# Patient Record
Sex: MALE | Race: OTHER | NOT HISPANIC OR LATINO | URBAN - METROPOLITAN AREA
[De-identification: names, ages, dates, MRNs, and addresses within clinical notes are randomized per-mention and may not be internally consistent; named-entity substitution may affect disease eponyms.]

---

## 2023-07-07 VITALS
OXYGEN SATURATION: 98 % | HEART RATE: 66 BPM | TEMPERATURE: 98 F | WEIGHT: 156.97 LBS | DIASTOLIC BLOOD PRESSURE: 76 MMHG | RESPIRATION RATE: 17 BRPM | SYSTOLIC BLOOD PRESSURE: 161 MMHG | HEIGHT: 67 IN

## 2023-07-07 LAB
HCT VFR BLD CALC: 32.8 % — LOW (ref 39–50)
HGB BLD-MCNC: 10.6 G/DL — LOW (ref 13–17)
MCHC RBC-ENTMCNC: 30.8 PG — SIGNIFICANT CHANGE UP (ref 27–34)
MCHC RBC-ENTMCNC: 32.3 GM/DL — SIGNIFICANT CHANGE UP (ref 32–36)
MCV RBC AUTO: 95.3 FL — SIGNIFICANT CHANGE UP (ref 80–100)
PLATELET # BLD AUTO: 193 K/UL — SIGNIFICANT CHANGE UP (ref 150–400)
RBC # BLD: 3.44 M/UL — LOW (ref 4.2–5.8)
RBC # FLD: 12.8 % — SIGNIFICANT CHANGE UP (ref 10.3–14.5)
WBC # BLD: 2.56 K/UL — LOW (ref 3.8–10.5)
WBC # FLD AUTO: 2.56 K/UL — LOW (ref 3.8–10.5)

## 2023-07-07 PROCEDURE — 99285 EMERGENCY DEPT VISIT HI MDM: CPT

## 2023-07-07 PROCEDURE — 99053 MED SERV 10PM-8AM 24 HR FAC: CPT

## 2023-07-07 PROCEDURE — 71045 X-RAY EXAM CHEST 1 VIEW: CPT | Mod: 26

## 2023-07-07 NOTE — ED ADULT TRIAGE NOTE - CHIEF COMPLAINT QUOTE
Pt presents with c/o bilateral "leg swelling" x approx one week. Denies any pain, SOB, or chest discomfort. Dx with lymphedema by clinic.

## 2023-07-07 NOTE — ED ADULT NURSE NOTE - NSFALLUNIVINTERV_ED_ALL_ED
Bed/Stretcher in lowest position, wheels locked, appropriate side rails in place/Call bell, personal items and telephone in reach/Instruct patient to call for assistance before getting out of bed/chair/stretcher/Non-slip footwear applied when patient is off stretcher/[place] to call system/Physically safe environment - no spills, clutter or unnecessary equipment/Purposeful proactive rounding/Room/bathroom lighting operational, light cord in reach

## 2023-07-08 ENCOUNTER — INPATIENT (INPATIENT)
Facility: HOSPITAL | Age: 83
LOS: 1 days | Discharge: HOME | DRG: 603 | End: 2023-07-10
Attending: STUDENT IN AN ORGANIZED HEALTH CARE EDUCATION/TRAINING PROGRAM | Admitting: STUDENT IN AN ORGANIZED HEALTH CARE EDUCATION/TRAINING PROGRAM
Payer: MEDICARE

## 2023-07-08 DIAGNOSIS — I16.0 HYPERTENSIVE URGENCY: ICD-10-CM

## 2023-07-08 DIAGNOSIS — Z29.9 ENCOUNTER FOR PROPHYLACTIC MEASURES, UNSPECIFIED: ICD-10-CM

## 2023-07-08 DIAGNOSIS — I10 ESSENTIAL (PRIMARY) HYPERTENSION: ICD-10-CM

## 2023-07-08 DIAGNOSIS — M79.89 OTHER SPECIFIED SOFT TISSUE DISORDERS: ICD-10-CM

## 2023-07-08 DIAGNOSIS — L03.90 CELLULITIS, UNSPECIFIED: ICD-10-CM

## 2023-07-08 DIAGNOSIS — D64.9 ANEMIA, UNSPECIFIED: ICD-10-CM

## 2023-07-08 DIAGNOSIS — L03.119 CELLULITIS OF UNSPECIFIED PART OF LIMB: ICD-10-CM

## 2023-07-08 LAB
ALBUMIN SERPL ELPH-MCNC: 3.9 G/DL — SIGNIFICANT CHANGE UP (ref 3.3–5)
ALP SERPL-CCNC: 108 U/L — SIGNIFICANT CHANGE UP (ref 40–120)
ALT FLD-CCNC: 16 U/L — SIGNIFICANT CHANGE UP (ref 10–45)
ANION GAP SERPL CALC-SCNC: 11 MMOL/L — SIGNIFICANT CHANGE UP (ref 5–17)
ANION GAP SERPL CALC-SCNC: 12 MMOL/L — SIGNIFICANT CHANGE UP (ref 5–17)
ANISOCYTOSIS BLD QL: SLIGHT — SIGNIFICANT CHANGE UP
AST SERPL-CCNC: 21 U/L — SIGNIFICANT CHANGE UP (ref 10–40)
BASOPHILS # BLD AUTO: 0.01 K/UL — SIGNIFICANT CHANGE UP (ref 0–0.2)
BASOPHILS # BLD AUTO: 0.02 K/UL — SIGNIFICANT CHANGE UP (ref 0–0.2)
BASOPHILS NFR BLD AUTO: 0.4 % — SIGNIFICANT CHANGE UP (ref 0–2)
BASOPHILS NFR BLD AUTO: 0.9 % — SIGNIFICANT CHANGE UP (ref 0–2)
BILIRUB SERPL-MCNC: 0.8 MG/DL — SIGNIFICANT CHANGE UP (ref 0.2–1.2)
BUN SERPL-MCNC: 10 MG/DL — SIGNIFICANT CHANGE UP (ref 7–23)
BUN SERPL-MCNC: 9 MG/DL — SIGNIFICANT CHANGE UP (ref 7–23)
BURR CELLS BLD QL SMEAR: PRESENT — SIGNIFICANT CHANGE UP
CALCIUM SERPL-MCNC: 9 MG/DL — SIGNIFICANT CHANGE UP (ref 8.4–10.5)
CALCIUM SERPL-MCNC: 9.2 MG/DL — SIGNIFICANT CHANGE UP (ref 8.4–10.5)
CHLORIDE SERPL-SCNC: 100 MMOL/L — SIGNIFICANT CHANGE UP (ref 96–108)
CHLORIDE SERPL-SCNC: 104 MMOL/L — SIGNIFICANT CHANGE UP (ref 96–108)
CO2 SERPL-SCNC: 24 MMOL/L — SIGNIFICANT CHANGE UP (ref 22–31)
CO2 SERPL-SCNC: 28 MMOL/L — SIGNIFICANT CHANGE UP (ref 22–31)
CREAT SERPL-MCNC: 1.03 MG/DL — SIGNIFICANT CHANGE UP (ref 0.5–1.3)
CREAT SERPL-MCNC: 1.04 MG/DL — SIGNIFICANT CHANGE UP (ref 0.5–1.3)
EGFR: 71 ML/MIN/1.73M2 — SIGNIFICANT CHANGE UP
EGFR: 72 ML/MIN/1.73M2 — SIGNIFICANT CHANGE UP
EOSINOPHIL # BLD AUTO: 0.02 K/UL — SIGNIFICANT CHANGE UP (ref 0–0.5)
EOSINOPHIL # BLD AUTO: 0.02 K/UL — SIGNIFICANT CHANGE UP (ref 0–0.5)
EOSINOPHIL NFR BLD AUTO: 0.8 % — SIGNIFICANT CHANGE UP (ref 0–6)
EOSINOPHIL NFR BLD AUTO: 0.8 % — SIGNIFICANT CHANGE UP (ref 0–6)
GIANT PLATELETS BLD QL SMEAR: PRESENT — SIGNIFICANT CHANGE UP
GLUCOSE SERPL-MCNC: 118 MG/DL — HIGH (ref 70–99)
GLUCOSE SERPL-MCNC: 94 MG/DL — SIGNIFICANT CHANGE UP (ref 70–99)
HCT VFR BLD CALC: 33.6 % — LOW (ref 39–50)
HGB BLD-MCNC: 11.2 G/DL — LOW (ref 13–17)
IMM GRANULOCYTES NFR BLD AUTO: 0.4 % — SIGNIFICANT CHANGE UP (ref 0–0.9)
LYMPHOCYTES # BLD AUTO: 0.77 K/UL — LOW (ref 1–3.3)
LYMPHOCYTES # BLD AUTO: 0.82 K/UL — LOW (ref 1–3.3)
LYMPHOCYTES # BLD AUTO: 28.9 % — SIGNIFICANT CHANGE UP (ref 13–44)
LYMPHOCYTES # BLD AUTO: 32.2 % — SIGNIFICANT CHANGE UP (ref 13–44)
MACROCYTES BLD QL: SLIGHT — SIGNIFICANT CHANGE UP
MAGNESIUM SERPL-MCNC: 1.7 MG/DL — SIGNIFICANT CHANGE UP (ref 1.6–2.6)
MANUAL SMEAR VERIFICATION: SIGNIFICANT CHANGE UP
MCHC RBC-ENTMCNC: 31.6 PG — SIGNIFICANT CHANGE UP (ref 27–34)
MCHC RBC-ENTMCNC: 33.3 GM/DL — SIGNIFICANT CHANGE UP (ref 32–36)
MCV RBC AUTO: 94.9 FL — SIGNIFICANT CHANGE UP (ref 80–100)
MICROCYTES BLD QL: SLIGHT — SIGNIFICANT CHANGE UP
MONOCYTES # BLD AUTO: 0.51 K/UL — SIGNIFICANT CHANGE UP (ref 0–0.9)
MONOCYTES # BLD AUTO: 0.64 K/UL — SIGNIFICANT CHANGE UP (ref 0–0.9)
MONOCYTES NFR BLD AUTO: 20 % — HIGH (ref 2–14)
MONOCYTES NFR BLD AUTO: 24.1 % — HIGH (ref 2–14)
MYELOCYTES NFR BLD: 0.9 % — HIGH (ref 0–0)
NEUTROPHILS # BLD AUTO: 1.16 K/UL — LOW (ref 1.8–7.4)
NEUTROPHILS # BLD AUTO: 1.21 K/UL — LOW (ref 1.8–7.4)
NEUTROPHILS NFR BLD AUTO: 45.2 % — SIGNIFICANT CHANGE UP (ref 43–77)
NEUTROPHILS NFR BLD AUTO: 45.4 % — SIGNIFICANT CHANGE UP (ref 43–77)
NRBC # BLD: 0 /100 WBCS — SIGNIFICANT CHANGE UP (ref 0–0)
NT-PROBNP SERPL-SCNC: 447 PG/ML — HIGH (ref 0–300)
OVALOCYTES BLD QL SMEAR: SLIGHT — SIGNIFICANT CHANGE UP
PHOSPHATE SERPL-MCNC: 3.5 MG/DL — SIGNIFICANT CHANGE UP (ref 2.5–4.5)
PLAT MORPH BLD: ABNORMAL
PLATELET # BLD AUTO: 197 K/UL — SIGNIFICANT CHANGE UP (ref 150–400)
POIKILOCYTOSIS BLD QL AUTO: SLIGHT — SIGNIFICANT CHANGE UP
POLYCHROMASIA BLD QL SMEAR: SLIGHT — SIGNIFICANT CHANGE UP
POTASSIUM SERPL-MCNC: 3 MMOL/L — LOW (ref 3.5–5.3)
POTASSIUM SERPL-MCNC: 3.5 MMOL/L — SIGNIFICANT CHANGE UP (ref 3.5–5.3)
POTASSIUM SERPL-SCNC: 3 MMOL/L — LOW (ref 3.5–5.3)
POTASSIUM SERPL-SCNC: 3.5 MMOL/L — SIGNIFICANT CHANGE UP (ref 3.5–5.3)
PROT SERPL-MCNC: 7 G/DL — SIGNIFICANT CHANGE UP (ref 6–8.3)
RBC # BLD: 3.54 M/UL — LOW (ref 4.2–5.8)
RBC # FLD: 12.5 % — SIGNIFICANT CHANGE UP (ref 10.3–14.5)
RBC BLD AUTO: ABNORMAL
SODIUM SERPL-SCNC: 139 MMOL/L — SIGNIFICANT CHANGE UP (ref 135–145)
SODIUM SERPL-SCNC: 140 MMOL/L — SIGNIFICANT CHANGE UP (ref 135–145)
WBC # BLD: 2.66 K/UL — LOW (ref 3.8–10.5)
WBC # FLD AUTO: 2.66 K/UL — LOW (ref 3.8–10.5)

## 2023-07-08 PROCEDURE — 93971 EXTREMITY STUDY: CPT | Mod: 26,RT

## 2023-07-08 PROCEDURE — 99223 1ST HOSP IP/OBS HIGH 75: CPT | Mod: GC

## 2023-07-08 PROCEDURE — 71045 X-RAY EXAM CHEST 1 VIEW: CPT | Mod: 26

## 2023-07-08 RX ORDER — FUROSEMIDE 40 MG
20 TABLET ORAL ONCE
Refills: 0 | Status: COMPLETED | OUTPATIENT
Start: 2023-07-08 | End: 2023-07-08

## 2023-07-08 RX ORDER — HYDRALAZINE HCL 50 MG
10 TABLET ORAL ONCE
Refills: 0 | Status: COMPLETED | OUTPATIENT
Start: 2023-07-08 | End: 2023-07-08

## 2023-07-08 RX ORDER — LANOLIN ALCOHOL/MO/W.PET/CERES
3 CREAM (GRAM) TOPICAL AT BEDTIME
Refills: 0 | Status: DISCONTINUED | OUTPATIENT
Start: 2023-07-08 | End: 2023-07-10

## 2023-07-08 RX ORDER — ACETAMINOPHEN 500 MG
650 TABLET ORAL EVERY 6 HOURS
Refills: 0 | Status: DISCONTINUED | OUTPATIENT
Start: 2023-07-08 | End: 2023-07-10

## 2023-07-08 RX ORDER — CEFAZOLIN SODIUM 1 G
1000 VIAL (EA) INJECTION EVERY 8 HOURS
Refills: 0 | Status: DISCONTINUED | OUTPATIENT
Start: 2023-07-08 | End: 2023-07-10

## 2023-07-08 RX ORDER — MAGNESIUM SULFATE 500 MG/ML
2 VIAL (ML) INJECTION ONCE
Refills: 0 | Status: COMPLETED | OUTPATIENT
Start: 2023-07-08 | End: 2023-07-08

## 2023-07-08 RX ORDER — POTASSIUM CHLORIDE 20 MEQ
40 PACKET (EA) ORAL EVERY 4 HOURS
Refills: 0 | Status: COMPLETED | OUTPATIENT
Start: 2023-07-08 | End: 2023-07-08

## 2023-07-08 RX ORDER — LABETALOL HCL 100 MG
20 TABLET ORAL ONCE
Refills: 0 | Status: COMPLETED | OUTPATIENT
Start: 2023-07-08 | End: 2023-07-08

## 2023-07-08 RX ORDER — VANCOMYCIN HCL 1 G
1000 VIAL (EA) INTRAVENOUS ONCE
Refills: 0 | Status: COMPLETED | OUTPATIENT
Start: 2023-07-08 | End: 2023-07-08

## 2023-07-08 RX ORDER — ENOXAPARIN SODIUM 100 MG/ML
40 INJECTION SUBCUTANEOUS EVERY 24 HOURS
Refills: 0 | Status: DISCONTINUED | OUTPATIENT
Start: 2023-07-08 | End: 2023-07-10

## 2023-07-08 RX ADMIN — Medication 100 MILLIGRAM(S): at 13:13

## 2023-07-08 RX ADMIN — Medication 40 MILLIEQUIVALENT(S): at 13:14

## 2023-07-08 RX ADMIN — Medication 100 MILLIGRAM(S): at 06:21

## 2023-07-08 RX ADMIN — Medication 100 MILLIGRAM(S): at 22:41

## 2023-07-08 RX ADMIN — Medication 20 MILLIGRAM(S): at 01:45

## 2023-07-08 RX ADMIN — Medication 20 MILLIGRAM(S): at 06:21

## 2023-07-08 RX ADMIN — ENOXAPARIN SODIUM 40 MILLIGRAM(S): 100 INJECTION SUBCUTANEOUS at 06:21

## 2023-07-08 RX ADMIN — Medication 20 MILLIGRAM(S): at 02:32

## 2023-07-08 RX ADMIN — Medication 250 MILLIGRAM(S): at 02:45

## 2023-07-08 RX ADMIN — Medication 40 MILLIEQUIVALENT(S): at 10:46

## 2023-07-08 RX ADMIN — Medication 10 MILLIGRAM(S): at 09:31

## 2023-07-08 RX ADMIN — Medication 25 GRAM(S): at 10:46

## 2023-07-08 NOTE — PATIENT PROFILE ADULT - FUNCTIONAL ASSESSMENT - BASIC MOBILITY 6.
3-calculated by average/Not able to assess (calculate score using [facility] averaging method)

## 2023-07-08 NOTE — H&P ADULT - ASSESSMENT
82 yo M w PMH of HTN, p/w 1 week of BLE swelling, R>L, admitted for diuresis, evaluation and management.

## 2023-07-08 NOTE — PROGRESS NOTE ADULT - PROBLEM SELECTOR PLAN 5
- Patient endorses taking metoprolol, unsure of dosing, goes to [facility]  - Nifedipine 30mg prn?  - Metoprolol 25mg BID,

## 2023-07-08 NOTE — PATIENT PROFILE ADULT - FALL HARM RISK - HARM RISK INTERVENTIONS

## 2023-07-08 NOTE — PROGRESS NOTE ADULT - PROBLEM SELECTOR PLAN 4
Pt hyptertensive to 200 systolically in ED. Given with   - Patient endorses taking metoprolol, unsure of dosing, goes to [facility]  - Lasix 40 IV BID  - Nifedipine 30mg prn for hypertensive urgency.   - Restart med recs. F: None   E: Replete as necessary K>4 Mg>2  N: Dash Diet   DVT Prophylaxis: Lovenox 40mg daily   GI prophylaxis: None   CODE STATUS: FULL  DISPO: [facility]

## 2023-07-08 NOTE — PATIENT PROFILE ADULT - NSPRESCRALCSIXMORE_GEN_A_NUR
I reviewed the H&P, I examined the patient, and there are no changes in the patient's condition.  
Never

## 2023-07-08 NOTE — H&P ADULT - NSHPLABSRESULTS_GEN_ALL_CORE
LABS:                         10.6   2.56  )-----------( 193      ( 07 Jul 2023 23:08 )             32.8     07-07    139  |  104  |  10  ----------------------------<  94  3.5   |  24  |  1.03    Ca    9.2      07 Jul 2023 23:08        Urinalysis Basic - ( 07 Jul 2023 23:08 )    Color: x / Appearance: x / SG: x / pH: x  Gluc: 94 mg/dL / Ketone: x  / Bili: x / Urobili: x   Blood: x / Protein: x / Nitrite: x   Leuk Esterase: x / RBC: x / WBC x   Sq Epi: x / Non Sq Epi: x / Bacteria: x                RADIOLOGY, EKG & ADDITIONAL TESTS:

## 2023-07-08 NOTE — ED PROVIDER NOTE - CLINICAL SUMMARY MEDICAL DECISION MAKING FREE TEXT BOX
BLE swelling, R>L w erythematous rash on RLE.  Concern for RLE DVT vs Cellulitis  R/o DVT w US  Labs  Abx for possible cellulitis  No evidence of arterial insufficiency  Pt non-septic, vs wnl  No evidence of overt CHF, pt breathing comfortably on RA, no orthopnea, clear lungs on CXR  Will give lasix, admit for IV abx and diuresis.

## 2023-07-08 NOTE — H&P ADULT - PROBLEM SELECTOR PLAN 4
F: None   E: Replete as necessary K>4 Mg>2  N: Dash Diet   DVT Prophylaxis: Lovenox 40mg daily   GI prophylaxis: None   CODE STATUS: FULL  DISPO: [facility] Iron studies in AM  No bleeding.  Active T&S Med Rec in AM  - Patient endorses taking metoprolol, unsure of dosing, goes to [facility]  - Nifedipine 30mg prn?  - Metoprolol 25mg BID,

## 2023-07-08 NOTE — H&P ADULT - ATTENDING COMMENTS
RLE non Purulent Cellulitis   Lower extremity Swelling   HTN urgency     - IV antibiotics   - Check UA   - Continue metoprolol , nifedipine   - echo RLE non Purulent Cellulitis   Lower extremity Swelling   HTN urgency   Leukopenia and anemia     - IV antibiotics   - Check UA  to evaluate for protein , if + will need further work up   - Continue metoprolol , nifedipine   - echo

## 2023-07-08 NOTE — PROGRESS NOTE ADULT - PROBLEM SELECTOR PLAN 1
Pt presenting with erythematous, macular rash with one bullae up to shins. Nonblanching, nonfluctuant, tender to palpation, nondraining. s/p 1x vancomycin in ED. Afebrile, nontachycardic, but leukoc  Plan:  - Cefazolin 1g [id number]  - Wound care consult in AM  - Tyelnol PRN for fever. Pt presenting with erythematous, macular rash with one bullae up to shins. Nonblanching, nonfluctuant, tender to palpation, nondraining. s/p 1x vancomycin in ED. Afebrile, nontachycardic, but leukoc  Plan:  - Cefazolin 1g [id number]  - Wound care consult  - Tyelnol PRN for fever.  - Daily weights, strict i/os  - Echocardiogram pending

## 2023-07-08 NOTE — PROGRESS NOTE ADULT - PROBLEM SELECTOR PLAN 2
Likely 2/2 non-purulent cellulitis   Pt presenting with 1 week of insidious LE swelling R>L.   Ddx including new onset HF exacerbation, changes 2/2 cellulitis/in. US showing no dvt. EKG NSR  Pt placed on Lasix, very dry on exam and low suspension for new onset HF, will d/c at this time   Plan:  - Treat cellulitsi as below  - Daily weights, strict i/os  - Echocardiogram pending Pt hyptertensive to 200 systolically in ED. Given with   - Patient endorses taking metoprolol, unsure dosing   Plan:  - Nifedipine 30mg prn for hypertensive urgency.   - Metoprolol 25mg BID,

## 2023-07-08 NOTE — H&P ADULT - PROBLEM SELECTOR PLAN 3
Med Rec in AM  - Patient endorses taking metoprolol, unsure of dosing, goes to [facility]  - Nifedipine 30mg prn?  - Metoprolol 25mg BID, Med Rec in AM. Pt hyptertensive to 200 systolically in ED.   - Patient endorses taking metoprolol, unsure of dosing, goes to [facility]  - Lasix 40 IV BID  - Nifedipine 30mg prn for hypertensive urgency.   - Restart med recs.

## 2023-07-08 NOTE — H&P ADULT - PROBLEM SELECTOR PLAN 6
F: None   E: Replete as necessary K>4 Mg>2  N: Dash Diet   DVT Prophylaxis: Lovenox 40mg daily   GI prophylaxis: None   CODE STATUS: FULL  DISPO: [facility]

## 2023-07-08 NOTE — ED PROVIDER NOTE - PHYSICAL EXAMINATION
CONSTITUTIONAL: Non-toxic; in no apparent distress  HEAD: Normocephalic; atraumatic  EYES: PERRL; EOM intact   ENMT: External appears normal  NECK: Supple; non-tender  CARD: Normal S1, S2; no murmurs, rubs, or gallops  RESP: Normal chest excursion with respiration; breath sounds clear and equal bilaterally  ABD: Soft, non-distended; non-tender  EXT: Normal ROM in all four extremities; + BLE peripheral edema to shins R>L, + erythematous circumferential rash on RLE shin/calf, mild ttp, +calor, WWP, cap refill <2s, DP and PT pulses 2+ BL   SKIN: Warm, dry, no rash  NEURO:  No focal neurological deficiencies.

## 2023-07-08 NOTE — PROGRESS NOTE ADULT - PROBLEM SELECTOR PLAN 7
F: None   E: Replete as necessary K>4 Mg>2  N: Dash Diet   DVT Prophylaxis: Lovenox 40mg daily   GI prophylaxis: None   CODE STATUS: FULL  DISPO: [facility]

## 2023-07-08 NOTE — H&P ADULT - PROBLEM SELECTOR PLAN 5
F: None   E: Replete as necessary K>4 Mg>2  N: Dash Diet   DVT Prophylaxis: Lovenox 40mg daily   GI prophylaxis: None   CODE STATUS: FULL  DISPO: [facility] Iron studies in AM  No bleeding.  Active T&S

## 2023-07-08 NOTE — ED PROVIDER NOTE - OBJECTIVE STATEMENT
84 yo M w PMH of HTN, p/w 1 week of BLE swelling, R>L. Patient has erythematous rash over RLE that is mildly TTP. No fever or chills. He has no history of venous insufficiency in the past, no history of CHF, states he has received echo in past year that was normal. No change in urination. He was sent in by his PMD for evaluation, concern for DVT, infection. No paresthesia, weakness, numbness, cough, hemoptysis, exogenous estrogen, recent travel, surgery, malignancy, personal or FHx of VTE.

## 2023-07-08 NOTE — H&P ADULT - PROBLEM SELECTOR PLAN 2
Pt presenting with erythematous, macular rash with one bullae up to shins. Nonblanching, nonfluctuant, tender to palpation, nondraining. s/p 1x vancomycin in ED. Afebrile, nontachycardic, but leukoc  - Cefazolin 1g [id number]  - Wound care consult in AM Pt presenting with erythematous, macular rash with one bullae up to shins. Nonblanching, nonfluctuant, tender to palpation, nondraining. s/p 1x vancomycin in ED. Afebrile, nontachycardic, but leukoc  - Cefazolin 1g [id number]  - Wound care consult in AM  - Tyelnol PRN for fever.

## 2023-07-08 NOTE — H&P ADULT - PROBLEM SELECTOR PLAN 1
Pt presenting with 1 week of insidious LE swelling R>L. Ddx including new onset HF exacerbation, changes 2/2 cellulitis. BNP slightly elevated. Pt presenting with 1 week of insidious LE swelling R>L. Ddx including new onset HF exacerbation, changes 2/2 cellulitis/in. US showing no dvt. EKG NSR  - Treat cellulitsi as below  - lasix 20mg IV qd in AM  - Daily weights, strict i/os  - Echocardiogram

## 2023-07-08 NOTE — H&P ADULT - HISTORY OF PRESENT ILLNESS
84 yo M w PMH of HTN, p/w insidious onset of 1 week of BLE swelling, R>L. The swelling started one week ago with a rash appearing at the same time- no prior fever, dyspnea, chest pain, fatigue, diarrhea, dysuria. He denies h/o CHF (endorses a benign echo in the last year). Due to the swelling he had a walk in appointment with his PCP who recommended that he come into the hospital.   Patient has had no trauma to the leg, does not remember if the rash spread or appeared all at once. He has had no sick contacts, medication changes, or recent surgery.     ED Course  T98.4 HR 71 /76->200/80->166/70 RR18 Sat 97%  WBC 2.56 Hb 10.6    US RLE: No DVT  CXR: Possible RLE consolidation  Interventions: Lasix 20 x 1, labetalol 20 x 1, vanc1g x 1.  EKG: NSR

## 2023-07-08 NOTE — H&P ADULT - NSHPPHYSICALEXAM_GEN_ALL_CORE
PHYSICAL EXAM:  Constitutional: NAD, comfortable in bed.  HEENT: NC/AT, PERRLA, EOMI, no conjunctival pallor or scleral icterus, MMM  Neck: Supple, no JVD  Respiratory: CTA B/L. No w/r/r.   Cardiovascular: RRR, normal S1 and S2, no m/r/g.   Gastrointestinal: +BS, soft NTND, no guarding or rebound tenderness, no palpable masses   Extremities: wwp; no cyanosis, clubbing. 2+ pitting edema to shins r>l.   Vascular: Pulses equal and strong throughout.   Neurological: AAOx3, no CN deficits, strength and sensation intact throughout.   Skin: Erythematous nonblanching maculopapular rash overlaid on venous stasis changes.

## 2023-07-09 LAB
ANION GAP SERPL CALC-SCNC: 9 MMOL/L — SIGNIFICANT CHANGE UP (ref 5–17)
BUN SERPL-MCNC: 11 MG/DL — SIGNIFICANT CHANGE UP (ref 7–23)
CALCIUM SERPL-MCNC: 8.7 MG/DL — SIGNIFICANT CHANGE UP (ref 8.4–10.5)
CHLORIDE SERPL-SCNC: 100 MMOL/L — SIGNIFICANT CHANGE UP (ref 96–108)
CO2 SERPL-SCNC: 29 MMOL/L — SIGNIFICANT CHANGE UP (ref 22–31)
CREAT SERPL-MCNC: 1.15 MG/DL — SIGNIFICANT CHANGE UP (ref 0.5–1.3)
EGFR: 63 ML/MIN/1.73M2 — SIGNIFICANT CHANGE UP
GLUCOSE SERPL-MCNC: 108 MG/DL — HIGH (ref 70–99)
HCT VFR BLD CALC: 33.8 % — LOW (ref 39–50)
HGB BLD-MCNC: 10.9 G/DL — LOW (ref 13–17)
MAGNESIUM SERPL-MCNC: 1.6 MG/DL — SIGNIFICANT CHANGE UP (ref 1.6–2.6)
MCHC RBC-ENTMCNC: 30.9 PG — SIGNIFICANT CHANGE UP (ref 27–34)
MCHC RBC-ENTMCNC: 32.2 GM/DL — SIGNIFICANT CHANGE UP (ref 32–36)
MCV RBC AUTO: 95.8 FL — SIGNIFICANT CHANGE UP (ref 80–100)
NRBC # BLD: 0 /100 WBCS — SIGNIFICANT CHANGE UP (ref 0–0)
PHOSPHATE SERPL-MCNC: 3.4 MG/DL — SIGNIFICANT CHANGE UP (ref 2.5–4.5)
PLATELET # BLD AUTO: 205 K/UL — SIGNIFICANT CHANGE UP (ref 150–400)
POTASSIUM SERPL-MCNC: 3.2 MMOL/L — LOW (ref 3.5–5.3)
POTASSIUM SERPL-SCNC: 3.2 MMOL/L — LOW (ref 3.5–5.3)
RBC # BLD: 3.53 M/UL — LOW (ref 4.2–5.8)
RBC # FLD: 13 % — SIGNIFICANT CHANGE UP (ref 10.3–14.5)
SODIUM SERPL-SCNC: 138 MMOL/L — SIGNIFICANT CHANGE UP (ref 135–145)
WBC # BLD: 2.47 K/UL — LOW (ref 3.8–10.5)
WBC # FLD AUTO: 2.47 K/UL — LOW (ref 3.8–10.5)

## 2023-07-09 PROCEDURE — 99233 SBSQ HOSP IP/OBS HIGH 50: CPT | Mod: GC

## 2023-07-09 RX ORDER — NIFEDIPINE 30 MG
30 TABLET, EXTENDED RELEASE 24 HR ORAL EVERY 24 HOURS
Refills: 0 | Status: DISCONTINUED | OUTPATIENT
Start: 2023-07-09 | End: 2023-07-10

## 2023-07-09 RX ADMIN — ENOXAPARIN SODIUM 40 MILLIGRAM(S): 100 INJECTION SUBCUTANEOUS at 06:46

## 2023-07-09 RX ADMIN — Medication 100 MILLIGRAM(S): at 13:16

## 2023-07-09 RX ADMIN — Medication 30 MILLIGRAM(S): at 08:59

## 2023-07-09 RX ADMIN — Medication 100 MILLIGRAM(S): at 23:15

## 2023-07-09 RX ADMIN — Medication 100 MILLIGRAM(S): at 06:46

## 2023-07-09 NOTE — PROGRESS NOTE ADULT - SUBJECTIVE AND OBJECTIVE BOX
Patient is a 83y old  Male who presents with a chief complaint of Right Lower extremity swelling and rash (08 Jul 2023 13:54)        SUBJECTIVE:  Patient was seen and examined at bedside.    Overnight Events : reports right lower extremity pain and rash improving , denies fever , chills        Review of systems: 12 point Review of systems negative unless otherwise documented elsewhere in note.     Diet, DASH/TLC:   Sodium & Cholesterol Restricted (07-08-23 @ 04:26) [Active]      MEDICATIONS:  MEDICATIONS  (STANDING):  ceFAZolin   IVPB 1000 milliGRAM(s) IV Intermittent every 8 hours  enoxaparin Injectable 40 milliGRAM(s) SubCutaneous every 24 hours  NIFEdipine XL 30 milliGRAM(s) Oral every 24 hours    MEDICATIONS  (PRN):  acetaminophen     Tablet .. 650 milliGRAM(s) Oral every 6 hours PRN Temp greater or equal to 38C (100.4F), Mild Pain (1 - 3)  melatonin 3 milliGRAM(s) Oral at bedtime PRN Insomnia      Allergies    No Known Allergies    Intolerances        OBJECTIVE:  Vital Signs Last 24 Hrs  T(C): 36.6 (09 Jul 2023 12:35), Max: 37.6 (08 Jul 2023 21:00)  T(F): 97.8 (09 Jul 2023 12:35), Max: 99.6 (08 Jul 2023 21:00)  HR: 91 (09 Jul 2023 12:35) (60 - 91)  BP: 143/69 (09 Jul 2023 12:35) (121/65 - 180/80)  BP(mean): --  RR: 18 (09 Jul 2023 12:35) (18 - 18)  SpO2: 96% (09 Jul 2023 12:35) (96% - 99%)    Parameters below as of 09 Jul 2023 12:35  Patient On (Oxygen Delivery Method): room air      I&O's Summary      PHYSICAL EXAM:  Gen: Resting in bed at time of exam, not in distress   HEENT: moist mucosa, no lesions   Neck: supple, trachea at midline  CV: RRR, +S1/S2  Pulm: no wheezing , no crackles  no increase in work of breathing  Abd: soft, NTND  Skin: circumferential erythema in the lower 1/3rd of the right leg with warmth and tenderness , patchy no circumferntial rash in the mid thigh , no purulence , 1cm bled in the right lower leg medial aspect draining clear fluid , no evidence of purulence   Ext:  Bilateral lower Extremity Pitting edema to the mid shin ,  Right > Left   Neuro: AOx3, no gross focal neurological deficits  Psych: affect and behavior appropriate, pleasant at time of interview    LABS:                        10.9   2.47  )-----------( 205      ( 09 Jul 2023 07:09 )             33.8     07-09    138  |  100  |  11  ----------------------------<  108<H>  3.2<L>   |  29  |  1.15    Ca    8.7      09 Jul 2023 07:09  Phos  3.4     07-09  Mg     1.6     07-09    TPro  7.0  /  Alb  3.9  /  TBili  0.8  /  DBili  x   /  AST  21  /  ALT  16  /  AlkPhos  108  07-08    LIVER FUNCTIONS - ( 08 Jul 2023 08:42 )  Alb: 3.9 g/dL / Pro: 7.0 g/dL / ALK PHOS: 108 U/L / ALT: 16 U/L / AST: 21 U/L / GGT: x             CAPILLARY BLOOD GLUCOSE        Urinalysis Basic - ( 09 Jul 2023 07:09 )    Color: x / Appearance: x / SG: x / pH: x  Gluc: 108 mg/dL / Ketone: x  / Bili: x / Urobili: x   Blood: x / Protein: x / Nitrite: x   Leuk Esterase: x / RBC: x / WBC x   Sq Epi: x / Non Sq Epi: x / Bacteria: x        MICRODATA:    Culture - Blood (collected 08 Jul 2023 00:31)  Source: .Blood Blood-Peripheral  Preliminary Report (09 Jul 2023 09:01):    No growth at 1 day.    Culture - Blood (collected 08 Jul 2023 00:31)  Source: .Blood Blood-Peripheral  Preliminary Report (09 Jul 2023 09:01):    No growth at 1 day.        RADIOLOGY/OTHER STUDIES:            
OVERNIGHT EVENTS: No overnight events     SUBJECTIVE:  The pt was seen and examined at the bedside this AM. The pt was not too cooperative with exam but stated that he had pain in his right leg.     ROS: Negative except for above     VITAL SIGNS:  Vital Signs Last 24 Hrs  T(C): 36.9 (08 Jul 2023 12:58), Max: 37 (08 Jul 2023 05:20)  T(F): 98.4 (08 Jul 2023 12:58), Max: 98.6 (08 Jul 2023 05:20)  HR: 74 (08 Jul 2023 12:58) (57 - 87)  BP: 156/68 (08 Jul 2023 12:58) (156/68 - 200/80)  BP(mean): --  RR: 18 (08 Jul 2023 05:20) (17 - 18)  SpO2: 99% (08 Jul 2023 05:20) (97% - 99%)    Parameters below as of 08 Jul 2023 05:20  Patient On (Oxygen Delivery Method): room air        PHYSICAL EXAM:  General: NAD; speaking in full sentences  HEENT: NC/AT; PERRL; EOMI; MMM  Neck: supple; no JVD  Cardiac: RRR; +S1/S2  Pulm: CTA B/L; no W/R/R  GI: soft, NT/ND, +BS  Extremities: WWP; no edema, clubbing or cyanosis  Right lower extremity: Edema from the ankle to the knee, maculopapular rash extending the length of the chin, blister noticed in the medial aspect of the ankle   Neuro: AAOx3; no focal deficits    MEDICATIONS:  MEDICATIONS  (STANDING):  ceFAZolin   IVPB 1000 milliGRAM(s) IV Intermittent every 8 hours  enoxaparin Injectable 40 milliGRAM(s) SubCutaneous every 24 hours    MEDICATIONS  (PRN):  acetaminophen     Tablet .. 650 milliGRAM(s) Oral every 6 hours PRN Temp greater or equal to 38C (100.4F), Mild Pain (1 - 3)  melatonin 3 milliGRAM(s) Oral at bedtime PRN Insomnia      ALLERGIES:  Allergies    No Known Allergies    Intolerances        LABS:                        11.2   2.66  )-----------( 197      ( 08 Jul 2023 08:42 )             33.6     07-08    140  |  100  |  9   ----------------------------<  118<H>  3.0<L>   |  28  |  1.04    Ca    9.0      08 Jul 2023 08:42  Phos  3.5     07-08  Mg     1.7     07-08    TPro  7.0  /  Alb  3.9  /  TBili  0.8  /  DBili  x   /  AST  21  /  ALT  16  /  AlkPhos  108  07-08        RADIOLOGY & ADDITIONAL TESTS: Reviewed.

## 2023-07-09 NOTE — PROGRESS NOTE ADULT - ASSESSMENT
83 year old male with right lower extremity swelling and rash     Impression and plan  # Right lower Extremity , non purulent cellulitis , IV cefazolin, rash and pain better than 7/8   # HTN urgency , continue nifedipine , clarify home medication regimen and restart   # Hypokalemia - oral replacement , if BP remains high and patient persistently hypokalemic - will need consider work up of causes of secondary HTN   # Normocytic Anemia - no active bleeding   # Leukopenia     Dispo : Home Tomorrow , lives with wife 
84 yo M w PMH of HTN, p/w 1 week of BLE swelling, R>L, rash noticed in his right lower extremity as well on exam. The pt was initially admitted for diuresis, now is being followed for possible non-purulent cellulitis

## 2023-07-10 VITALS
SYSTOLIC BLOOD PRESSURE: 159 MMHG | DIASTOLIC BLOOD PRESSURE: 69 MMHG | TEMPERATURE: 98 F | OXYGEN SATURATION: 100 % | HEART RATE: 68 BPM | RESPIRATION RATE: 17 BRPM

## 2023-07-10 LAB
ALBUMIN SERPL ELPH-MCNC: 3.5 G/DL — SIGNIFICANT CHANGE UP (ref 3.3–5)
ALP SERPL-CCNC: 92 U/L — SIGNIFICANT CHANGE UP (ref 40–120)
ALT FLD-CCNC: 14 U/L — SIGNIFICANT CHANGE UP (ref 10–45)
ANION GAP SERPL CALC-SCNC: 7 MMOL/L — SIGNIFICANT CHANGE UP (ref 5–17)
ANISOCYTOSIS BLD QL: SLIGHT — SIGNIFICANT CHANGE UP
AST SERPL-CCNC: 24 U/L — SIGNIFICANT CHANGE UP (ref 10–40)
BASOPHILS # BLD AUTO: 0 K/UL — SIGNIFICANT CHANGE UP (ref 0–0.2)
BASOPHILS NFR BLD AUTO: 0 % — SIGNIFICANT CHANGE UP (ref 0–2)
BILIRUB SERPL-MCNC: 0.5 MG/DL — SIGNIFICANT CHANGE UP (ref 0.2–1.2)
BUN SERPL-MCNC: 9 MG/DL — SIGNIFICANT CHANGE UP (ref 7–23)
BURR CELLS BLD QL SMEAR: PRESENT — SIGNIFICANT CHANGE UP
CALCIUM SERPL-MCNC: 8.8 MG/DL — SIGNIFICANT CHANGE UP (ref 8.4–10.5)
CHLORIDE SERPL-SCNC: 103 MMOL/L — SIGNIFICANT CHANGE UP (ref 96–108)
CO2 SERPL-SCNC: 30 MMOL/L — SIGNIFICANT CHANGE UP (ref 22–31)
CREAT SERPL-MCNC: 1.06 MG/DL — SIGNIFICANT CHANGE UP (ref 0.5–1.3)
DACRYOCYTES BLD QL SMEAR: SLIGHT — SIGNIFICANT CHANGE UP
EGFR: 69 ML/MIN/1.73M2 — SIGNIFICANT CHANGE UP
EOSINOPHIL # BLD AUTO: 0.11 K/UL — SIGNIFICANT CHANGE UP (ref 0–0.5)
EOSINOPHIL NFR BLD AUTO: 3.5 % — SIGNIFICANT CHANGE UP (ref 0–6)
GIANT PLATELETS BLD QL SMEAR: PRESENT — SIGNIFICANT CHANGE UP
GLUCOSE SERPL-MCNC: 98 MG/DL — SIGNIFICANT CHANGE UP (ref 70–99)
HCT VFR BLD CALC: 32.6 % — LOW (ref 39–50)
HGB BLD-MCNC: 10.5 G/DL — LOW (ref 13–17)
HYPOCHROMIA BLD QL: SLIGHT — SIGNIFICANT CHANGE UP
LYMPHOCYTES # BLD AUTO: 0.91 K/UL — LOW (ref 1–3.3)
LYMPHOCYTES # BLD AUTO: 28.3 % — SIGNIFICANT CHANGE UP (ref 13–44)
MACROCYTES BLD QL: SLIGHT — SIGNIFICANT CHANGE UP
MAGNESIUM SERPL-MCNC: 1.7 MG/DL — SIGNIFICANT CHANGE UP (ref 1.6–2.6)
MANUAL SMEAR VERIFICATION: SIGNIFICANT CHANGE UP
MCHC RBC-ENTMCNC: 31 PG — SIGNIFICANT CHANGE UP (ref 27–34)
MCHC RBC-ENTMCNC: 32.2 GM/DL — SIGNIFICANT CHANGE UP (ref 32–36)
MCV RBC AUTO: 96.2 FL — SIGNIFICANT CHANGE UP (ref 80–100)
MICROCYTES BLD QL: SLIGHT — SIGNIFICANT CHANGE UP
MONOCYTES # BLD AUTO: 0.29 K/UL — SIGNIFICANT CHANGE UP (ref 0–0.9)
MONOCYTES NFR BLD AUTO: 8.9 % — SIGNIFICANT CHANGE UP (ref 2–14)
NEUTROPHILS # BLD AUTO: 1.91 K/UL — SIGNIFICANT CHANGE UP (ref 1.8–7.4)
NEUTROPHILS NFR BLD AUTO: 59.3 % — SIGNIFICANT CHANGE UP (ref 43–77)
OVALOCYTES BLD QL SMEAR: SIGNIFICANT CHANGE UP
PHOSPHATE SERPL-MCNC: 2.8 MG/DL — SIGNIFICANT CHANGE UP (ref 2.5–4.5)
PLAT MORPH BLD: ABNORMAL
PLATELET # BLD AUTO: 225 K/UL — SIGNIFICANT CHANGE UP (ref 150–400)
POIKILOCYTOSIS BLD QL AUTO: SIGNIFICANT CHANGE UP
POLYCHROMASIA BLD QL SMEAR: SLIGHT — SIGNIFICANT CHANGE UP
POTASSIUM SERPL-MCNC: 3.4 MMOL/L — LOW (ref 3.5–5.3)
POTASSIUM SERPL-SCNC: 3.4 MMOL/L — LOW (ref 3.5–5.3)
PROT SERPL-MCNC: 6.5 G/DL — SIGNIFICANT CHANGE UP (ref 6–8.3)
RBC # BLD: 3.39 M/UL — LOW (ref 4.2–5.8)
RBC # FLD: 13 % — SIGNIFICANT CHANGE UP (ref 10.3–14.5)
RBC BLD AUTO: ABNORMAL
SMUDGE CELLS # BLD: PRESENT — SIGNIFICANT CHANGE UP
SODIUM SERPL-SCNC: 140 MMOL/L — SIGNIFICANT CHANGE UP (ref 135–145)
SPHEROCYTES BLD QL SMEAR: SLIGHT — SIGNIFICANT CHANGE UP
WBC # BLD: 3.22 K/UL — LOW (ref 3.8–10.5)
WBC # FLD AUTO: 3.22 K/UL — LOW (ref 3.8–10.5)

## 2023-07-10 PROCEDURE — 83880 ASSAY OF NATRIURETIC PEPTIDE: CPT

## 2023-07-10 PROCEDURE — 36415 COLL VENOUS BLD VENIPUNCTURE: CPT

## 2023-07-10 PROCEDURE — 97161 PT EVAL LOW COMPLEX 20 MIN: CPT

## 2023-07-10 PROCEDURE — 96375 TX/PRO/DX INJ NEW DRUG ADDON: CPT

## 2023-07-10 PROCEDURE — 83735 ASSAY OF MAGNESIUM: CPT

## 2023-07-10 PROCEDURE — 93971 EXTREMITY STUDY: CPT

## 2023-07-10 PROCEDURE — 99285 EMERGENCY DEPT VISIT HI MDM: CPT

## 2023-07-10 PROCEDURE — 96374 THER/PROPH/DIAG INJ IV PUSH: CPT

## 2023-07-10 PROCEDURE — 84100 ASSAY OF PHOSPHORUS: CPT

## 2023-07-10 PROCEDURE — 80048 BASIC METABOLIC PNL TOTAL CA: CPT

## 2023-07-10 PROCEDURE — 87040 BLOOD CULTURE FOR BACTERIA: CPT

## 2023-07-10 PROCEDURE — 85027 COMPLETE CBC AUTOMATED: CPT

## 2023-07-10 PROCEDURE — 84484 ASSAY OF TROPONIN QUANT: CPT

## 2023-07-10 PROCEDURE — 99239 HOSP IP/OBS DSCHRG MGMT >30: CPT | Mod: GC

## 2023-07-10 PROCEDURE — 93005 ELECTROCARDIOGRAM TRACING: CPT

## 2023-07-10 PROCEDURE — 71045 X-RAY EXAM CHEST 1 VIEW: CPT

## 2023-07-10 PROCEDURE — 85025 COMPLETE CBC W/AUTO DIFF WBC: CPT

## 2023-07-10 PROCEDURE — 80053 COMPREHEN METABOLIC PANEL: CPT

## 2023-07-10 RX ORDER — CEPHALEXIN 500 MG
1 CAPSULE ORAL
Qty: 32 | Refills: 0
Start: 2023-07-10 | End: 2023-07-17

## 2023-07-10 RX ORDER — POTASSIUM CHLORIDE 20 MEQ
40 PACKET (EA) ORAL ONCE
Refills: 0 | Status: COMPLETED | OUTPATIENT
Start: 2023-07-10 | End: 2023-07-10

## 2023-07-10 RX ORDER — POTASSIUM PHOSPHATE, MONOBASIC POTASSIUM PHOSPHATE, DIBASIC 236; 224 MG/ML; MG/ML
15 INJECTION, SOLUTION INTRAVENOUS ONCE
Refills: 0 | Status: COMPLETED | OUTPATIENT
Start: 2023-07-10 | End: 2023-07-10

## 2023-07-10 RX ADMIN — Medication 40 MILLIEQUIVALENT(S): at 12:02

## 2023-07-10 RX ADMIN — Medication 100 MILLIGRAM(S): at 12:02

## 2023-07-10 RX ADMIN — Medication 30 MILLIGRAM(S): at 05:57

## 2023-07-10 RX ADMIN — ENOXAPARIN SODIUM 40 MILLIGRAM(S): 100 INJECTION SUBCUTANEOUS at 05:46

## 2023-07-10 RX ADMIN — Medication 100 MILLIGRAM(S): at 05:47

## 2023-07-10 RX ADMIN — POTASSIUM PHOSPHATE, MONOBASIC POTASSIUM PHOSPHATE, DIBASIC 62.5 MILLIMOLE(S): 236; 224 INJECTION, SOLUTION INTRAVENOUS at 10:45

## 2023-07-10 NOTE — DISCHARGE NOTE NURSING/CASE MANAGEMENT/SOCIAL WORK - PATIENT PORTAL LINK FT
You can access the FollowMyHealth Patient Portal offered by [facility] by registering at the following website: [email or web address]. By joining [facility]’s FollowMyHealth portal, you will also be able to view your health information using other applications (apps) compatible with our system.

## 2023-07-10 NOTE — PHYSICAL THERAPY INITIAL EVALUATION ADULT - PERTINENT HX OF CURRENT PROBLEM, REHAB EVAL
82yo M w PMH of HTN, p/w 1 week of BLE swelling, R>L, rash noticed in his right lower extremity as well on exam. The pt was initially admitted for diuresis, now is being followed for possible non-purulent cellulitis.

## 2023-07-10 NOTE — DISCHARGE NOTE PROVIDER - HOSPITAL COURSE
#Discharge: do not delete    84 yo Male w PMH of HTN who presented to the hospital with 1 week of bilateral lower extremity swelling, Right worse than left, and a rash in his right lower extremity consistent with cellulitis. The pt was admitted for management of right lower extremity cellulitis.       Problem List/Main Diagnoses:     #Non-purulent Cellulitis of Right Lower Extremity  Patient with one week of swelling, painful rash in the Right lower extremity  Afebrile, non-tachycardic, no elevated WBC.   Due to bilateral LE swelling, initially pt received diuretic for possible new onset HF   Patient was given Vancomycin x1 in the ED, changed to cefazolin 1g Q8H from 7/8 to 7/10  Improvement of symptoms prior to discharge on 7/10  Blood cx: No growth in 2 days   Plan:  - Start Cephalexin (Keflex) 500mg Q6H for 8 more days (until 7/18)   - Follow up with PCP for further management     #Hypertension  Pt with a hx of Hypertension   Pt hyptertensive to 200 systolically in ED, given hydralazine for hypertensive urgency  Patient endorses taking metoprolol, unsure dosing   Given nifedipine 30mg and inpatient   BP ~160 systolic   Plan:  - Continue outpatient Metoprolol as adjusted outpatient by PCP       New medications/therapies: Cephalexin (Keflex) 500mg Q6H  New lines/hardware: None   Labs to be followed outpatient: None   Exam to be followed outpatient: None     Discharge plan: discharge to Home    Physical Exam Upon Discharge:  T(C): 37.1 (07-10-23 @ 05:13), Max: 37.1 (07-10-23 @ 05:13)  HR: 69 (07-10-23 @ 05:13) (62 - 91)  BP: 165/73 (07-10-23 @ 05:13) (143/69 - 177/80)  RR: 18 (07-10-23 @ 05:13) (18 - 18)  SpO2: 96% (07-10-23 @ 05:13) (96% - 98%)    General: laying in bed, speaking in full sentences  HEENT: no conjunctival injection, EOMI, MMM  Neck: supple, no JVD  Cardio: RRR, +S1/S2, no M/R/G  Resp: lungs CTAB, no cough/wheezes/rales/rhonchi  Abdo: soft, NT, ND, +bowel sounds x4    Extremities: Swelling of the right lower extremity and rash  Vasc: 2+ radial and DP pulses b/l       #Discharge: do not delete    84 yo Male w PMH of HTN who presented to the hospital with 1 week of bilateral lower extremity swelling, R>L with RLE rash consistent with cellulitis, admitted for management  of RLE cellulitis. Treated with IV antibiotics with improvement in erythema, warmth, tenderness. Patient was medically optimized, stable and ready for discharge. Plan of care and return precautions were discussed with the patient who verbally stated understanding.     Problem List/Main Diagnoses:   #Non-purulent Cellulitis of Right Lower Extremity  Patient with one week of swelling, painful rash in the Right lower extremity  Afebrile, non-tachycardic, no elevated WBC.   Due to bilateral LE swelling, initially pt received diuretic   Patient was given Vancomycin x1 in the ED, changed to cefazolin 1g Q8H from 7/8 to 7/10  Improvement in swelling, erythema, warmth prior to discharge on 7/10  Blood cx: No growth x 2 days   Plan:  - Start Cephalexin (Keflex) 500mg Q6H for 8 more days (through 7/18)   - Follow up with PCP for further management     #Hypertension  Pt with a hx of Hypertension   Pt hyptertensive to 200 systolically in ED, given hydralazine for hypertensive urgency  Patient endorses taking metoprolol, unsure dosing   Given nifedipine 30mg and inpatient   BP ~160 systolic   Plan:  - Continue outpatient antihypertensives as prescribed by PCP      New medications/therapies: Cephalexin (Keflex) 500mg Q6H x 8d  New lines/hardware: None   Labs to be followed outpatient: None   Exam to be followed outpatient: Primary Care    Discharge plan: discharge to Home    Physical Exam Upon Discharge:  T(C): 37.1 (07-10-23 @ 05:13), Max: 37.1 (07-10-23 @ 05:13)  HR: 69 (07-10-23 @ 05:13) (62 - 91)  BP: 165/73 (07-10-23 @ 05:13) (143/69 - 177/80)  RR: 18 (07-10-23 @ 05:13) (18 - 18)  SpO2: 96% (07-10-23 @ 05:13) (96% - 98%)    General: laying in bed, speaking in full sentences  HEENT: no conjunctival injection, EOMI, MMM  Neck: supple, no JVD  Cardio: RRR, +S1/S2, no M/R/G  Resp: lungs CTAB, no cough/wheezes/rales/rhonchi  Abdo: soft, NT, ND, +bowel sounds x4    Extremities: 2+ edema RLE foot to knee, erythema R knee to ankle with overlying lace-like rash  Vasc: 2+ radial and DP pulses b/l

## 2023-07-10 NOTE — DISCHARGE NOTE PROVIDER - ATTENDING DISCHARGE PHYSICAL EXAMINATION:
General: laying in bed, speaking in full sentences  HEENT: no conjunctival injection, EOMI, MMM  Neck: supple, no JVD  Cardio: RRR, +S1/S2, no M/R/G  Resp: lungs CTAB, no cough/wheezes/rales/rhonchi  Abdo: soft, NT, ND, +bowel sounds x4    Extremities: 2+ edema RLE foot to knee, erythema R knee to ankle with overlying lace-like rash  Vasc: 2+ radial and DP pulses b/l    #Non Purulent Cellulitis - Keflex    Rest as above

## 2023-07-10 NOTE — DISCHARGE NOTE NURSING/CASE MANAGEMENT/SOCIAL WORK - NSDCPEFALRISK_GEN_ALL_CORE
For information on Fall & Injury Prevention, visit: [email or web address] OR  [email or web address] OR  https://www.cdc.gov/steadi/patient.html

## 2023-07-10 NOTE — PHYSICAL THERAPY INITIAL EVALUATION ADULT - GENERAL OBSERVATIONS, REHAB EVAL
PT IE Completed. Pt received semi-supine in bed, A&Ox4, +RA,+heplock, in 1/10 RLE pain with erythema and swelling, and agreeable to work with PT, NONA Polanco notified. Pt admitted for diuresis and non-purulent cellulitis. PT exam shows pt is safe and independent with all bed mobility, functional transfers and ambulation, and stair negotiation. Pt seated in bedside chair, NONA Polanco notified, 1/10 RLE pain, +call bell within reach. Pt is discharged from PT program at this time. Should patient status change, new PT consult is recommended.

## 2023-07-10 NOTE — DISCHARGE NOTE PROVIDER - NSDCCPCAREPLAN_GEN_ALL_CORE_FT
PRINCIPAL DISCHARGE DIAGNOSIS  Diagnosis: Cellulitis  Assessment and Plan of Treatment: Cellulitis is a deep infection of the skin caused by bacteria. It usually affects the arms and legs. It can also develop around the eyes, mouth, and anus, or on the belly. Normal skin can be affected by cellulitis, but it usually happens after some type of injury causes a skin break, including trauma or surgery. Once the skin breaks, bacteria can enter and cause infection.  You presented to the hospital with swelling in your right leg and a rash that was concerning for cellulitis. We started you on an antiobiotic called cefazolin intravenously in order to treat your cellulitis. Your symptoms improve with this medication and we decided to change you to the oral form of the same medication.   INSTRUCTIONS:  - Please take cephalexin (Keflex) 500mg Every 8 hours until 7/18. Start taking the medication this evening.      SECONDARY DISCHARGE DIAGNOSES  Diagnosis: Peripheral edema  Assessment and Plan of Treatment:      PRINCIPAL DISCHARGE DIAGNOSIS  Diagnosis: Cellulitis  Assessment and Plan of Treatment: Cellulitis is a deep infection of the skin caused by bacteria. It usually affects the arms and legs. It can also develop around the eyes, mouth, and anus, or on the belly. Normal skin can be affected by cellulitis, but it usually happens after some type of injury causes a skin break, including trauma or surgery. Once the skin breaks, bacteria can enter and cause infection.  You presented to the hospital with swelling in your right leg and a rash that was concerning for cellulitis. We started you on an antibiotic called cefazolin intravenously in order to treat your cellulitis. Your symptoms improved with this medication. You will be going home on an oral antibiotic similar to to the one you received intravenously.   INSTRUCTIONS:  - Please take cephalexin (Keflex) 500mg Every 8 hours through 7/18.   - Take the first dose of medication this evening 7/10.     PRINCIPAL DISCHARGE DIAGNOSIS  Diagnosis: Cellulitis  Assessment and Plan of Treatment: Cellulitis is a deep infection of the skin caused by bacteria. It usually affects the arms and legs. It can also develop around the eyes, mouth, and anus, or on the belly. Normal skin can be affected by cellulitis, but it usually happens after some type of injury causes a skin break, including trauma or surgery. Once the skin breaks, bacteria can enter and cause infection.  You presented to the hospital with swelling in your right leg and a rash that was concerning for cellulitis. We started you on an antibiotic called cefazolin intravenously in order to treat your cellulitis. Your symptoms improved with this medication. You will be going home on an oral antibiotic similar to to the one you received intravenously.   If your symptoms are not improved in 8 days, please visit your primary care office to continue antibiotics for an additional 4 days.   INSTRUCTIONS:  - Please take cephalexin (Keflex) 500mg Every 8 hours through 7/18.   - Take the first dose of medication this evening 7/10.

## 2023-07-13 LAB
CULTURE RESULTS: SIGNIFICANT CHANGE UP
CULTURE RESULTS: SIGNIFICANT CHANGE UP
SPECIMEN SOURCE: SIGNIFICANT CHANGE UP
SPECIMEN SOURCE: SIGNIFICANT CHANGE UP

## 2023-07-14 DIAGNOSIS — E87.6 HYPOKALEMIA: ICD-10-CM

## 2023-07-14 DIAGNOSIS — I10 ESSENTIAL (PRIMARY) HYPERTENSION: ICD-10-CM

## 2023-07-14 DIAGNOSIS — I16.0 HYPERTENSIVE URGENCY: ICD-10-CM

## 2023-07-14 DIAGNOSIS — D64.9 ANEMIA, UNSPECIFIED: ICD-10-CM

## 2023-07-14 DIAGNOSIS — L03.115 CELLULITIS OF RIGHT LOWER LIMB: ICD-10-CM

## 2023-07-14 DIAGNOSIS — D72.819 DECREASED WHITE BLOOD CELL COUNT, UNSPECIFIED: ICD-10-CM
